# Patient Record
Sex: FEMALE | Race: WHITE | NOT HISPANIC OR LATINO | URBAN - METROPOLITAN AREA
[De-identification: names, ages, dates, MRNs, and addresses within clinical notes are randomized per-mention and may not be internally consistent; named-entity substitution may affect disease eponyms.]

---

## 2019-07-15 ENCOUNTER — MART 1 (OUTPATIENT)
Dept: URBAN - METROPOLITAN AREA CLINIC 25 | Facility: CLINIC | Age: 49
Setting detail: DERMATOLOGY
End: 2019-07-15

## 2019-07-15 PROBLEM — D03.39 MELANOMA IN SITU OF OTHER PARTS OF FACE: Status: ACTIVE | Noted: 2019-07-15

## 2019-07-15 PROBLEM — D03.39 MELANOMA IN SITU OF OTHER PARTS OF FACE: Status: RESOLVED | Noted: 2019-07-15

## 2019-07-15 PROCEDURE — 17311 MOHS 1 STAGE H/N/HF/G: CPT

## 2019-07-15 PROCEDURE — 13132 CMPLX RPR F/C/C/M/N/AX/G/H/F: CPT

## 2019-07-15 PROCEDURE — 99244 OFF/OP CNSLTJ NEW/EST MOD 40: CPT

## 2019-07-15 PROCEDURE — 88342 IMHCHEM/IMCYTCHM 1ST ANTB: CPT

## 2019-07-17 PROBLEM — D03.39 MELANOMA IN SITU OF OTHER PARTS OF FACE: Status: RESOLVED | Noted: 2019-07-17

## 2023-07-14 LAB — SARS-COV-2 RESULT: NOT DETECTED

## 2023-10-06 ENCOUNTER — OFFICE VISIT (OUTPATIENT)
Dept: URGENT CARE | Facility: CLINIC | Age: 53
End: 2023-10-06
Payer: COMMERCIAL

## 2023-10-06 VITALS
WEIGHT: 153.6 LBS | BODY MASS INDEX: 27.21 KG/M2 | OXYGEN SATURATION: 98 % | SYSTOLIC BLOOD PRESSURE: 130 MMHG | DIASTOLIC BLOOD PRESSURE: 90 MMHG | HEIGHT: 63 IN | TEMPERATURE: 98.5 F | HEART RATE: 74 BPM

## 2023-10-06 DIAGNOSIS — H66.91 ACUTE OTITIS MEDIA, RIGHT: ICD-10-CM

## 2023-10-06 DIAGNOSIS — J01.90 SUBACUTE SINUSITIS, UNSPECIFIED LOCATION: Primary | ICD-10-CM

## 2023-10-06 PROCEDURE — 99203 OFFICE O/P NEW LOW 30 MIN: CPT

## 2023-10-06 RX ORDER — METHYLPREDNISOLONE 4 MG/1
4 TABLET ORAL ONCE
Qty: 21 TABLET | Refills: 0 | Status: SHIPPED | OUTPATIENT
Start: 2023-10-06 | End: 2023-10-06

## 2023-10-06 RX ORDER — AMOXICILLIN AND CLAVULANATE POTASSIUM 875; 125 MG/1; MG/1
875 TABLET, FILM COATED ORAL EVERY 12 HOURS
Qty: 14 TABLET | Refills: 0 | Status: SHIPPED | OUTPATIENT
Start: 2023-10-06 | End: 2023-10-13

## 2023-10-06 ASSESSMENT — ENCOUNTER SYMPTOMS
RESPIRATORY NEGATIVE: 1
SINUS PAIN: 1
MYALGIAS: 1
SHORTNESS OF BREATH: 0
ARTHRALGIAS: 0
HEADACHES: 1
COUGH: 0
RHINORRHEA: 1
FEVER: 1
EYE PAIN: 1
EYE REDNESS: 0
SORE THROAT: 0
SINUS PRESSURE: 1

## 2023-10-06 NOTE — PROGRESS NOTES
"Janes Schroeder is a 53 y.o. female who presents for Sinus Problem.    Patient presents with sinus congestion and pressure much worse over the last 24 hours. She states that she has had a lot of sneezing and congestion for weeks due to allergies for which she takes allegra and other allergy medications daily. She states that she had a fever this morning and is feeling pressure in her head and eyes. She states that she did a home COVID test which was negative today.       History provided by:  Patient      /90 (BP Location: Left arm, Patient Position: Sitting, BP Cuff Size: Adult)   Pulse 74   Temp 36.9 °C (98.5 °F)   Ht 1.588 m (5' 2.5\")   Wt 69.7 kg (153 lb 9.6 oz)   SpO2 98%   BMI 27.65 kg/m²    All vitals have been reviewed and are stable.    Review of Systems   Constitutional:  Positive for fever.   HENT:  Positive for congestion, ear pain, rhinorrhea, sinus pressure, sinus pain and sneezing. Negative for sore throat.    Eyes:  Positive for pain. Negative for redness.   Respiratory: Negative.  Negative for cough and shortness of breath.    Musculoskeletal:  Positive for myalgias. Negative for arthralgias.   Neurological:  Positive for headaches.       Objective   Physical Exam  Vitals and nursing note reviewed.   Constitutional:       General: She is awake. She is not in acute distress.     Appearance: Normal appearance. She is well-developed.   HENT:      Head: Normocephalic and atraumatic.      Right Ear: Hearing, ear canal and external ear normal. A middle ear effusion is present. Tympanic membrane is erythematous.      Left Ear: Hearing, tympanic membrane, ear canal and external ear normal.      Nose: Congestion and rhinorrhea present. Rhinorrhea is purulent.      Right Turbinates: Swollen.      Left Turbinates: Swollen.      Mouth/Throat:      Pharynx: Uvula midline. Oropharyngeal exudate and posterior oropharyngeal erythema present.   Eyes:      Extraocular Movements: " Extraocular movements intact.      Pupils: Pupils are equal, round, and reactive to light.   Pulmonary:      Effort: Pulmonary effort is normal. No respiratory distress.      Breath sounds: Normal breath sounds and air entry. No stridor.   Musculoskeletal:         General: No swelling or deformity. Normal range of motion.      Cervical back: Normal range of motion.   Skin:     General: Skin is warm and dry.   Neurological:      General: No focal deficit present.      Mental Status: She is alert and oriented to person, place, and time. Mental status is at baseline.      Motor: Motor function is intact.      Coordination: Coordination is intact.   Psychiatric:         Mood and Affect: Mood and affect normal.         Speech: Speech normal.         Behavior: Behavior normal.         Assessment/Plan   Problem List Items Addressed This Visit    None  Visit Diagnoses       Subacute sinusitis, unspecified location    -  Primary    Relevant Medications    amoxicillin-pot clavulanate (Augmentin) 875-125 mg tablet    Acute otitis media, right        Relevant Medications    amoxicillin-pot clavulanate (Augmentin) 875-125 mg tablet            Red flags to report to Emergency Department have been reviewed with patient.   Patient/parent reports understanding and agreement with treatment plan made with shared decision making.

## 2023-10-09 NOTE — PATIENT INSTRUCTIONS
SUBACUTE SINUSITIS       - Pseudoephedrine - Guaifenesin (Mucinex D) may be used for sinus congestion relief and thinning of mucus      - FLUTICASONE (Flonase) is a nasal steroid that helps reduce swelling in your nasal passages, may use saline rinse (Neti pot) to clear sinus cavities before use     - Use a daily antihistamine (Zytec, Xyzal, Claritin, Allegra) for allergic rhinitis, recommended to take at nighttime as may be sedating.  If difficulty sleeping, Benadryl may be used instead     - Use Ibuprofen (Advil) or Acetaminophen (Tylenol) at home for headache and fever reduction if needed       - Increase rest and fluids to recover sooner and loosen mucus     - May use a humidifier, menthol containing cough drops, saline nasal wash (Neti pot) for symptom relief       - If no improvement after 10 days, please follow up with PCP, if you need a referral, please call our office.

## 2024-01-09 ENCOUNTER — OFFICE VISIT (OUTPATIENT)
Dept: URGENT CARE | Facility: CLINIC | Age: 54
End: 2024-01-09
Payer: COMMERCIAL

## 2024-01-09 VITALS
OXYGEN SATURATION: 99 % | HEART RATE: 77 BPM | DIASTOLIC BLOOD PRESSURE: 78 MMHG | SYSTOLIC BLOOD PRESSURE: 112 MMHG | TEMPERATURE: 97.9 F

## 2024-01-09 DIAGNOSIS — J06.9 UPPER RESPIRATORY TRACT INFECTION, UNSPECIFIED TYPE: ICD-10-CM

## 2024-01-09 DIAGNOSIS — R52 BODY ACHES: Primary | ICD-10-CM

## 2024-01-09 LAB — POC SARS-COV-2 AG BINAX: NORMAL

## 2024-01-09 PROCEDURE — 87811 SARS-COV-2 COVID19 W/OPTIC: CPT | Performed by: PHYSICIAN ASSISTANT

## 2024-01-09 PROCEDURE — 87636 SARSCOV2 & INF A&B AMP PRB: CPT

## 2024-01-09 PROCEDURE — 99214 OFFICE O/P EST MOD 30 MIN: CPT | Performed by: PHYSICIAN ASSISTANT

## 2024-01-09 RX ORDER — ESTRADIOL 0.04 MG/D
PATCH TRANSDERMAL
COMMUNITY
Start: 2021-11-10 | End: 2024-03-08 | Stop reason: DRUGHIGH

## 2024-01-09 RX ORDER — DULOXETIN HYDROCHLORIDE 20 MG/1
1 CAPSULE, DELAYED RELEASE ORAL EVERY 12 HOURS
COMMUNITY
Start: 2019-07-31

## 2024-01-09 RX ORDER — FEXOFENADINE HCL 60 MG
TABLET ORAL EVERY 12 HOURS
COMMUNITY
End: 2024-03-08 | Stop reason: DRUGHIGH

## 2024-01-09 NOTE — PROGRESS NOTES
Subjective     Nancy Schroeder is a 53 y.o. female who presents for Cough.    Patient presents with headache, chills, sinus pressure, congestion, drainage. Headaches have been located in the frontal sinuses and on the top of the head. Left is worse than right. Is also having body aches as of this morning. Is also having some insomnia the last few days. Has been using Advil in the morning. Admits to some SOB and chest tightness in the mornings, but this dissipates throughout the day. Also reports L arm tightness at night time -- has happened before when she has COVID. Denies chest pain/pressure, no increase in symptoms with activity. L arm tightness is completely gone at this time. Does not have known HTN, hyperlipidemia, DM2, or other heart history. Denies family Hx of heart issues that she is aware of.       History provided by:  Patient      /78   Pulse 77   Temp 36.6 °C (97.9 °F)   SpO2 99%    All vitals have been reviewed and are stable.    Review of Systems   All other systems reviewed and are negative.      Objective   Physical Exam  Vitals reviewed.   Constitutional:       General: She is awake.      Appearance: Normal appearance. She is well-developed.   HENT:      Head: Normocephalic and atraumatic.      Right Ear: Tympanic membrane and ear canal normal.      Left Ear: Tympanic membrane and ear canal normal.      Nose: Mucosal edema and congestion present.      Right Turbinates: Enlarged.      Left Turbinates: Enlarged.      Left Sinus: Frontal sinus tenderness present.      Mouth/Throat:      Lips: Pink.      Mouth: Mucous membranes are moist.      Pharynx: Oropharynx is clear.   Cardiovascular:      Rate and Rhythm: Normal rate and regular rhythm.   Pulmonary:      Effort: Pulmonary effort is normal.      Breath sounds: Normal breath sounds.   Musculoskeletal:      Cervical back: Full passive range of motion without pain.      Right lower leg: No edema.      Left lower leg: No edema.   Skin:      General: Skin is warm and dry.      Findings: No lesion or rash.   Neurological:      General: No focal deficit present.      Mental Status: She is alert and oriented to person, place, and time.      Cranial Nerves: No facial asymmetry.      Motor: Motor function is intact.      Gait: Gait is intact.   Psychiatric:         Attention and Perception: Attention normal.         Mood and Affect: Mood and affect normal.         Assessment/Plan   Problem List Items Addressed This Visit    None  Visit Diagnoses       Body aches    -  Primary    Relevant Orders    Sars-CoV-2 and Influenza A/B PCR    Upper respiratory tract infection, unspecified type        Relevant Orders    POCT BinaxNOW Covid-19 Ag Card manually resulted (Completed)            For now, use nasal spray (Nasocort) and OTC medications for your symptoms. Negative rapid COVID, but PCR was sent out for flu and COVID just in case. Will contact you regarding results and determine if symptoms are worsening or if anything else needs done at that time. Increase rest and fluids. Did offer EKG at this time due to L arm symptoms with some intermittent SOB and chest tightness -- pt declined, states that it was only intermittent and did not feel a pain, only a tightness like it was hard to breathe. Has also been under increased stress, which she thinks could have been causing it as well. Red flag symptoms reviewed with patient and all questions answered. Patient or parent/guardian verbalized understanding and agreement with care plan as above. All in office testing reviewed with patient. If symptoms worsen or do not improve, patient is to follow up with PCP or report to the ER.

## 2024-01-10 LAB
FLUAV RNA RESP QL NAA+PROBE: NOT DETECTED
FLUBV RNA RESP QL NAA+PROBE: NOT DETECTED
SARS-COV-2 RNA RESP QL NAA+PROBE: NOT DETECTED

## 2024-01-22 ENCOUNTER — OFFICE VISIT (OUTPATIENT)
Dept: URGENT CARE | Facility: CLINIC | Age: 54
End: 2024-01-22
Payer: COMMERCIAL

## 2024-01-22 VITALS
TEMPERATURE: 98.7 F | DIASTOLIC BLOOD PRESSURE: 86 MMHG | WEIGHT: 156 LBS | OXYGEN SATURATION: 97 % | HEART RATE: 61 BPM | SYSTOLIC BLOOD PRESSURE: 137 MMHG | HEIGHT: 63 IN | BODY MASS INDEX: 27.64 KG/M2

## 2024-01-22 DIAGNOSIS — J01.10 ACUTE NON-RECURRENT FRONTAL SINUSITIS: ICD-10-CM

## 2024-01-22 DIAGNOSIS — R30.0 DYSURIA: ICD-10-CM

## 2024-01-22 DIAGNOSIS — J06.9 VIRAL UPPER RESPIRATORY TRACT INFECTION: Primary | ICD-10-CM

## 2024-01-22 LAB
POC APPEARANCE, URINE: CLEAR
POC BILIRUBIN, URINE: NEGATIVE
POC BLOOD, URINE: NEGATIVE
POC COLOR, URINE: ABNORMAL
POC GLUCOSE, URINE: NEGATIVE MG/DL
POC KETONES, URINE: NEGATIVE MG/DL
POC LEUKOCYTES, URINE: NEGATIVE
POC NITRITE,URINE: NEGATIVE
POC PH, URINE: 7.5 PH
POC PROTEIN, URINE: NEGATIVE MG/DL
POC SARS-COV-2 AG BINAX: NORMAL
POC SPECIFIC GRAVITY, URINE: 1.01
POC UROBILINOGEN, URINE: 0.2 EU/DL

## 2024-01-22 PROCEDURE — 87636 SARSCOV2 & INF A&B AMP PRB: CPT

## 2024-01-22 PROCEDURE — 81003 URINALYSIS AUTO W/O SCOPE: CPT | Mod: CLIA WAIVED TEST | Performed by: PHYSICIAN ASSISTANT

## 2024-01-22 PROCEDURE — 99213 OFFICE O/P EST LOW 20 MIN: CPT | Performed by: PHYSICIAN ASSISTANT

## 2024-01-22 PROCEDURE — 87811 SARS-COV-2 COVID19 W/OPTIC: CPT | Mod: CLIA WAIVED TEST | Performed by: PHYSICIAN ASSISTANT

## 2024-01-22 RX ORDER — AMOXICILLIN AND CLAVULANATE POTASSIUM 875; 125 MG/1; MG/1
1 TABLET, FILM COATED ORAL 2 TIMES DAILY
Qty: 20 TABLET | Refills: 0 | Status: SHIPPED | OUTPATIENT
Start: 2024-01-22 | End: 2024-02-01

## 2024-01-22 NOTE — PROGRESS NOTES
"Janes Schroeder is a 53 y.o. female who presents for No chief complaint on file..    Patient presents today with nose congestion, ears feel plugged, fever yesterday, head pressure, thoracic back pain, arm pain, frequency urination, smell x 2 days. States that the abdominal pain and the urinary odor is negative. Was seen earlier in the month and tested for influenza, COVID -- all negative at that time. Denies using any OTC medications for her Sx. Denies: dizziness, CP, N/V/D, rash, swelling, bruising, loss of sense of taste/smell. Also had a lot of fatigue, feeling worn down.       History provided by:  Patient   used: No        /86 (BP Location: Left arm, Patient Position: Sitting, BP Cuff Size: Small adult)   Pulse 61   Temp 37.1 °C (98.7 °F) (Oral)   Ht 1.588 m (5' 2.5\")   Wt 70.8 kg (156 lb)   SpO2 97%   BMI 28.08 kg/m²    All vitals have been reviewed and are stable.    Review of Systems   All other systems reviewed and are negative.      Objective     Physical Exam  Vitals reviewed.   Constitutional:       General: She is awake.      Appearance: Normal appearance. She is well-developed.   HENT:      Head: Normocephalic and atraumatic.      Right Ear: Tympanic membrane and ear canal normal.      Left Ear: Tympanic membrane and ear canal normal.      Nose: Rhinorrhea present. Rhinorrhea is clear.      Right Turbinates: Enlarged and swollen.      Left Turbinates: Enlarged and swollen.      Mouth/Throat:      Lips: Pink.      Mouth: Mucous membranes are moist.      Pharynx: Oropharynx is clear.   Cardiovascular:      Rate and Rhythm: Normal rate and regular rhythm.   Pulmonary:      Effort: Pulmonary effort is normal.      Breath sounds: Decreased air movement and transmitted upper airway sounds present. No wheezing, rhonchi or rales.   Musculoskeletal:      Cervical back: Full passive range of motion without pain.      Right lower leg: No edema.      Left lower leg: " No edema.   Skin:     General: Skin is warm and dry.      Findings: No lesion or rash.   Neurological:      General: No focal deficit present.      Mental Status: She is alert and oriented to person, place, and time.      Cranial Nerves: No facial asymmetry.      Motor: Motor function is intact.      Gait: Gait is intact.   Psychiatric:         Attention and Perception: Attention normal.         Mood and Affect: Mood and affect normal.         Assessment/Plan   Problem List Items Addressed This Visit    None  Visit Diagnoses       Viral upper respiratory tract infection    -  Primary    Relevant Orders    Sars-CoV-2 and Influenza A/B PCR (Completed)    Dysuria        Relevant Orders    POCT UA Automated manually resulted (Completed)    POCT BinaxNOW Covid-19 Ag Card manually resulted (Completed)    Acute non-recurrent frontal sinusitis              Augmentin Rx given for suspected sinusitis. Negative POCT flu and COVID. PCR testing was done as well. Take medication as Rx, can use OTC medications with this as well. Increase rest and fluids.    Patient education provided to patient and documented in AVS. Red flag symptoms reviewed with patient and all questions answered. Patient or parent/guardian verbalized understanding and agreement with care plan as above. All in office testing reviewed with patient. If symptoms worsen or do not improve, patient is to follow up with PCP or report to the ER.

## 2024-03-08 ENCOUNTER — OFFICE VISIT (OUTPATIENT)
Dept: URGENT CARE | Facility: CLINIC | Age: 54
End: 2024-03-08
Payer: COMMERCIAL

## 2024-03-08 VITALS
HEIGHT: 63 IN | BODY MASS INDEX: 27.21 KG/M2 | WEIGHT: 153.6 LBS | TEMPERATURE: 98.9 F | DIASTOLIC BLOOD PRESSURE: 96 MMHG | SYSTOLIC BLOOD PRESSURE: 151 MMHG | OXYGEN SATURATION: 97 % | HEART RATE: 79 BPM

## 2024-03-08 DIAGNOSIS — J01.10 ACUTE NON-RECURRENT FRONTAL SINUSITIS: ICD-10-CM

## 2024-03-08 DIAGNOSIS — J06.9 VIRAL UPPER RESPIRATORY TRACT INFECTION: Primary | ICD-10-CM

## 2024-03-08 LAB
POC RAPID INFLUENZA A: NEGATIVE
POC RAPID INFLUENZA B: NEGATIVE
POC SARS-COV-2 AG BINAX: NORMAL

## 2024-03-08 PROCEDURE — 87811 SARS-COV-2 COVID19 W/OPTIC: CPT | Mod: CLIA WAIVED TEST | Performed by: NURSE PRACTITIONER

## 2024-03-08 PROCEDURE — 99213 OFFICE O/P EST LOW 20 MIN: CPT | Performed by: NURSE PRACTITIONER

## 2024-03-08 PROCEDURE — 87804 INFLUENZA ASSAY W/OPTIC: CPT | Mod: CLIA WAIVED TEST | Performed by: NURSE PRACTITIONER

## 2024-03-08 RX ORDER — FAMOTIDINE 20 MG/1
20 TABLET, FILM COATED ORAL
COMMUNITY
Start: 2022-05-02

## 2024-03-08 RX ORDER — AMOXICILLIN AND CLAVULANATE POTASSIUM 875; 125 MG/1; MG/1
1 TABLET, FILM COATED ORAL 2 TIMES DAILY
Qty: 14 TABLET | Refills: 0 | Status: SHIPPED | OUTPATIENT
Start: 2024-03-08 | End: 2024-03-15

## 2024-03-08 RX ORDER — OMEPRAZOLE 20 MG/1
1 CAPSULE, DELAYED RELEASE ORAL EVERY 24 HOURS
COMMUNITY

## 2024-03-08 RX ORDER — FEXOFENADINE HCL 60 MG
TABLET ORAL
COMMUNITY
Start: 2017-10-23

## 2024-03-08 RX ORDER — ESTRADIOL 0.03 MG/D
FILM, EXTENDED RELEASE TRANSDERMAL
COMMUNITY
Start: 2024-02-12

## 2024-03-08 RX ORDER — CYANOCOBALAMIN (VITAMIN B-12) 500 MCG
TABLET ORAL
COMMUNITY
Start: 2015-11-16

## 2024-03-08 ASSESSMENT — ENCOUNTER SYMPTOMS: SINUS COMPLAINT: 1

## 2024-03-08 NOTE — PROGRESS NOTES
Subjective   Patient ID: Nancy Schroeder is a 53 y.o. female.    Patient presents today with joint pain, runny nose, sore throat, headache 2 days, wants covid and flu testing she states this started like allergies with runny nose and congestion then developed into sinus pressure.  Slight joint pain but she states has been under a lot of stress recently as well.  Denies any fever.,  Believes it is more of a sinus infection.      History provided by:  Patient   used: No    Sinus Problem      The following portions of the chart were reviewed this encounter and updated as appropriate:         Review of Systems   All other systems reviewed and are negative.    Objective   Physical Exam  Vitals and nursing note reviewed.   Constitutional:       General: She is not in acute distress.     Appearance: Normal appearance. She is not toxic-appearing.   HENT:      Head: Normocephalic.      Right Ear: External ear normal.      Left Ear: External ear normal.      Nose: Nose normal.      Mouth/Throat:      Mouth: Mucous membranes are moist.      Pharynx: No oropharyngeal exudate or posterior oropharyngeal erythema.   Eyes:      Extraocular Movements: Extraocular movements intact.   Cardiovascular:      Rate and Rhythm: Normal rate and regular rhythm.      Heart sounds: Normal heart sounds.   Pulmonary:      Effort: Pulmonary effort is normal.      Breath sounds: Normal breath sounds. No wheezing.   Musculoskeletal:         General: Normal range of motion.      Cervical back: Normal range of motion and neck supple.   Skin:     Capillary Refill: Capillary refill takes less than 2 seconds.   Neurological:      General: No focal deficit present.      Mental Status: She is alert and oriented to person, place, and time.   Psychiatric:         Mood and Affect: Mood normal.         Behavior: Behavior normal.         Thought Content: Thought content normal.       Procedures    Assessment/Plan   Diagnoses and all orders  for this visit:  Viral upper respiratory tract infection  -     POCT Influenza A/B manually resulted  -     POCT BinaxNOW Covid-19 Ag Card manually resulted  Acute non-recurrent frontal sinusitis  -     amoxicillin-pot clavulanate (Augmentin) 875-125 mg tablet; Take 1 tablet by mouth 2 times a day for 7 days.

## 2024-03-19 ENCOUNTER — OFFICE VISIT (OUTPATIENT)
Dept: URGENT CARE | Facility: CLINIC | Age: 54
End: 2024-03-19
Payer: COMMERCIAL

## 2024-03-19 VITALS
SYSTOLIC BLOOD PRESSURE: 160 MMHG | DIASTOLIC BLOOD PRESSURE: 90 MMHG | BODY MASS INDEX: 27.18 KG/M2 | WEIGHT: 151 LBS | HEART RATE: 89 BPM | OXYGEN SATURATION: 95 %

## 2024-03-19 DIAGNOSIS — J06.9 VIRAL UPPER RESPIRATORY TRACT INFECTION: Primary | ICD-10-CM

## 2024-03-19 DIAGNOSIS — J01.40 SUBACUTE PANSINUSITIS: ICD-10-CM

## 2024-03-19 PROBLEM — R09.82 POST-NASAL DRAINAGE: Status: RESOLVED | Noted: 2024-03-19 | Resolved: 2024-03-19

## 2024-03-19 PROBLEM — J33.9 NASAL POLYP: Status: ACTIVE | Noted: 2024-03-19

## 2024-03-19 PROBLEM — R35.0 URINARY FREQUENCY: Status: RESOLVED | Noted: 2024-03-19 | Resolved: 2024-03-19

## 2024-03-19 PROBLEM — J30.89 ENVIRONMENTAL AND SEASONAL ALLERGIES: Status: ACTIVE | Noted: 2024-03-19

## 2024-03-19 PROBLEM — K21.9 GASTROESOPHAGEAL REFLUX DISEASE WITHOUT ESOPHAGITIS: Status: ACTIVE | Noted: 2024-03-19

## 2024-03-19 PROBLEM — H93.293 ABNORMAL AUDITORY PERCEPTION OF BOTH EARS: Status: ACTIVE | Noted: 2024-03-19

## 2024-03-19 PROBLEM — R51.9 FACIAL PAIN: Status: ACTIVE | Noted: 2024-03-19

## 2024-03-19 PROBLEM — R51.9 FRONTAL HEADACHE: Status: ACTIVE | Noted: 2024-03-19

## 2024-03-19 PROBLEM — R52 GENERALIZED PAIN: Status: ACTIVE | Noted: 2024-03-19

## 2024-03-19 PROBLEM — J34.2 DEVIATED NASAL SEPTUM: Status: ACTIVE | Noted: 2024-03-19

## 2024-03-19 PROBLEM — R06.83 SNORING: Status: ACTIVE | Noted: 2024-03-19

## 2024-03-19 PROBLEM — R10.2 SUPRAPUBIC PAIN: Status: RESOLVED | Noted: 2024-03-19 | Resolved: 2024-03-19

## 2024-03-19 PROBLEM — D03.30: Status: ACTIVE | Noted: 2020-07-22

## 2024-03-19 PROBLEM — J34.1 RETENTION CYST OF NASAL SINUS: Status: ACTIVE | Noted: 2024-03-19

## 2024-03-19 PROBLEM — Z86.39 HISTORY OF ENDOCRINE DISORDER: Status: RESOLVED | Noted: 2024-03-19 | Resolved: 2024-03-19

## 2024-03-19 PROBLEM — Z85.828 HISTORY OF MALIGNANT NEOPLASM OF SKIN: Status: RESOLVED | Noted: 2024-03-19 | Resolved: 2024-03-19

## 2024-03-19 PROBLEM — R06.02 SHORTNESS OF BREATH WITH EXPOSURE TO COVID-19 VIRUS: Status: RESOLVED | Noted: 2024-03-19 | Resolved: 2024-03-19

## 2024-03-19 PROBLEM — M22.42 CHONDROMALACIA OF LEFT PATELLA: Status: ACTIVE | Noted: 2019-08-02

## 2024-03-19 PROBLEM — Z86.59 HISTORY OF DEPRESSION: Status: RESOLVED | Noted: 2024-03-19 | Resolved: 2024-03-19

## 2024-03-19 PROBLEM — R03.0 FINDING OF ABOVE NORMAL BLOOD PRESSURE: Status: ACTIVE | Noted: 2024-03-19

## 2024-03-19 PROBLEM — Z20.822 SHORTNESS OF BREATH WITH EXPOSURE TO COVID-19 VIRUS: Status: RESOLVED | Noted: 2024-03-19 | Resolved: 2024-03-19

## 2024-03-19 PROBLEM — J34.3 NASAL TURBINATE HYPERTROPHY: Status: ACTIVE | Noted: 2024-03-19

## 2024-03-19 PROBLEM — J32.0 RIGHT MAXILLARY SINUSITIS: Status: RESOLVED | Noted: 2024-03-19 | Resolved: 2024-03-19

## 2024-03-19 PROBLEM — F41.9 ANXIETY: Status: ACTIVE | Noted: 2024-03-19

## 2024-03-19 LAB — POC SARS-COV-2 AG BINAX: NORMAL

## 2024-03-19 PROCEDURE — 87811 SARS-COV-2 COVID19 W/OPTIC: CPT

## 2024-03-19 PROCEDURE — 99214 OFFICE O/P EST MOD 30 MIN: CPT

## 2024-03-19 PROCEDURE — 87636 SARSCOV2 & INF A&B AMP PRB: CPT

## 2024-03-19 RX ORDER — BENZONATATE 200 MG/1
200 CAPSULE ORAL 3 TIMES DAILY PRN
Qty: 21 CAPSULE | Refills: 0 | Status: SHIPPED | OUTPATIENT
Start: 2024-03-19 | End: 2024-03-26

## 2024-03-19 RX ORDER — DOXYCYCLINE 100 MG/1
100 CAPSULE ORAL 2 TIMES DAILY
Qty: 14 CAPSULE | Refills: 0 | Status: SHIPPED | OUTPATIENT
Start: 2024-03-19 | End: 2024-03-26

## 2024-03-19 RX ORDER — METHYLPREDNISOLONE 4 MG/1
TABLET ORAL
Qty: 21 TABLET | Refills: 0 | Status: SHIPPED | OUTPATIENT
Start: 2024-03-19

## 2024-03-19 ASSESSMENT — ENCOUNTER SYMPTOMS
SWEATS: 1
DIARRHEA: 0
SHORTNESS OF BREATH: 0
DIZZINESS: 0
NAUSEA: 0
DIAPHORESIS: 1
SORE THROAT: 0
SINUS PRESSURE: 1
CHEST TIGHTNESS: 0
HEADACHES: 1
FATIGUE: 0
GASTROINTESTINAL NEGATIVE: 1
CHILLS: 0
RHINORRHEA: 1
PALPITATIONS: 0
COUGH: 1
MYALGIAS: 1
WHEEZING: 0
CARDIOVASCULAR NEGATIVE: 1
ABDOMINAL PAIN: 0
FEVER: 0
TROUBLE SWALLOWING: 0
ARTHRALGIAS: 0
VOMITING: 0

## 2024-03-19 ASSESSMENT — COPD QUESTIONNAIRES: COPD: 0

## 2024-03-19 NOTE — PROGRESS NOTES
Subjective     Nancy Schroeder is a 53 y.o. female who presents for Cough.    Patient presents with cough, headache, diarrhea, and muscle aches for the last 1-2 days. She reports that her mom is hospitalized with COVID and she has been visiting/exposed. She states that she has been having sinus congestion/drainage and pressure for the last 2 weeks and was seen 1.5 weeks ago and prescribed 7 days augmentin which she completed. She reports that her symptoms got a little better but then worse again now.       History provided by:  Patient and medical records  Cough  This is a recurrent problem. The cough is Non-productive. Associated symptoms include headaches, myalgias, nasal congestion, postnasal drip, rhinorrhea and sweats. Pertinent negatives include no chest pain, chills, ear congestion, ear pain, fever, rash, sore throat, shortness of breath or wheezing. She has tried OTC cough suppressant and rest for the symptoms. Her past medical history is significant for environmental allergies. There is no history of asthma or COPD.       /90   Pulse 89   Wt 68.5 kg (151 lb)   SpO2 95%   BMI 27.18 kg/m²    All vitals have been reviewed and are stable.    Review of Systems   Constitutional:  Positive for diaphoresis. Negative for chills, fatigue and fever.   HENT:  Positive for congestion, postnasal drip, rhinorrhea and sinus pressure. Negative for ear pain, sore throat and trouble swallowing.    Respiratory:  Positive for cough. Negative for chest tightness, shortness of breath and wheezing.    Cardiovascular: Negative.  Negative for chest pain and palpitations.   Gastrointestinal: Negative.  Negative for abdominal pain, diarrhea, nausea and vomiting.   Musculoskeletal:  Positive for myalgias. Negative for arthralgias.   Skin: Negative.  Negative for rash.   Allergic/Immunologic: Positive for environmental allergies.   Neurological:  Positive for headaches. Negative for dizziness.       Objective   Physical  Exam  Vitals and nursing note reviewed.   Constitutional:       General: She is not in acute distress.     Appearance: Normal appearance. She is ill-appearing.   HENT:      Head: Normocephalic and atraumatic.      Right Ear: External ear normal.      Left Ear: External ear normal.      Nose: Mucosal edema, congestion and rhinorrhea present.      Right Sinus: Maxillary sinus tenderness present.      Left Sinus: Maxillary sinus tenderness present.      Mouth/Throat:      Lips: Pink.      Mouth: Mucous membranes are moist.      Palate: No lesions.      Pharynx: Uvula midline. Oropharyngeal exudate and posterior oropharyngeal erythema present.      Tonsils: No tonsillar exudate.   Eyes:      Extraocular Movements: Extraocular movements intact.      Conjunctiva/sclera: Conjunctivae normal.      Right eye: Right conjunctiva is not injected.      Left eye: Left conjunctiva is not injected.      Pupils: Pupils are equal, round, and reactive to light.   Cardiovascular:      Rate and Rhythm: Normal rate and regular rhythm.      Heart sounds: Normal heart sounds.   Pulmonary:      Effort: Pulmonary effort is normal. No respiratory distress.      Breath sounds: Normal breath sounds and air entry. Transmitted upper airway sounds present. No stridor. No wheezing, rhonchi or rales.   Abdominal:      General: Abdomen is flat.      Palpations: Abdomen is soft.      Tenderness: There is no abdominal tenderness.   Musculoskeletal:         General: Normal range of motion.      Cervical back: Normal range of motion and neck supple.   Lymphadenopathy:      Cervical: No cervical adenopathy.   Skin:     General: Skin is warm and dry.   Neurological:      General: No focal deficit present.      Mental Status: She is alert and oriented to person, place, and time. Mental status is at baseline.   Psychiatric:         Mood and Affect: Mood normal.         Behavior: Behavior normal.         Assessment/Plan   Problem List Items Addressed This  Visit    None  Visit Diagnoses       Viral upper respiratory tract infection    -  Primary    Relevant Medications    benzonatate (Tessalon) 200 mg capsule    methylPREDNISolone (Medrol Dospak) 4 mg tablets    Other Relevant Orders    Sars-CoV-2 and Influenza A/B PCR    POCT BinaxNOW Covid-19 Ag Card manually resulted (Completed)    Subacute pansinusitis        Relevant Medications    doxycycline (Monodox) 100 mg capsule    methylPREDNISolone (Medrol Dospak) 4 mg tablets            Red flags for reporting to ER have been reviewed with the patient.    Current diagnosis, any medication changes, and all in-office lab or radiologic results have been reviewed with the patient at the time of the visit.   If symptoms do not improve or worsen, patient is to follow up with PCP or report to the emergency room.   Patient is alert and oriented x3 and non-toxic appearing. Vital signs are stable.   Patient and/or guardian has sufficient decision-making capabilities at this time and reports understanding and agreement with the treatment plan made through shared decision-making.

## 2024-07-12 ENCOUNTER — OFFICE VISIT (OUTPATIENT)
Dept: URGENT CARE | Facility: CLINIC | Age: 54
End: 2024-07-12
Payer: COMMERCIAL

## 2024-07-12 VITALS
BODY MASS INDEX: 26.64 KG/M2 | SYSTOLIC BLOOD PRESSURE: 129 MMHG | DIASTOLIC BLOOD PRESSURE: 92 MMHG | TEMPERATURE: 98.5 F | OXYGEN SATURATION: 98 % | WEIGHT: 148 LBS | HEART RATE: 78 BPM

## 2024-07-12 DIAGNOSIS — J06.9 UPPER RESPIRATORY TRACT INFECTION, UNSPECIFIED TYPE: Primary | ICD-10-CM

## 2024-07-12 DIAGNOSIS — J01.00 SUBACUTE MAXILLARY SINUSITIS: ICD-10-CM

## 2024-07-12 LAB — POC SARS-COV-2 AG BINAX: NORMAL

## 2024-07-12 PROCEDURE — 87636 SARSCOV2 & INF A&B AMP PRB: CPT

## 2024-07-12 PROCEDURE — 99214 OFFICE O/P EST MOD 30 MIN: CPT

## 2024-07-12 PROCEDURE — 87811 SARS-COV-2 COVID19 W/OPTIC: CPT

## 2024-07-12 RX ORDER — DULOXETINE 40 MG/1
1 CAPSULE, DELAYED RELEASE ORAL
COMMUNITY
Start: 2024-01-08

## 2024-07-12 RX ORDER — PROGESTERONE 100 MG/1
100 CAPSULE ORAL NIGHTLY
COMMUNITY
Start: 2023-08-14

## 2024-07-12 RX ORDER — BLOOD-GLUCOSE SENSOR
EACH MISCELLANEOUS
COMMUNITY
Start: 2024-03-19

## 2024-07-12 RX ORDER — MULTIVIT-MIN/IRON FUM/FOLIC AC 7.5 MG-4
TABLET ORAL
COMMUNITY

## 2024-07-12 ASSESSMENT — VISUAL ACUITY: OU: 1

## 2024-07-12 ASSESSMENT — ENCOUNTER SYMPTOMS
CHILLS: 1
WEAKNESS: 1
FATIGUE: 1
ARTHRALGIAS: 0
MYALGIAS: 1
CARDIOVASCULAR NEGATIVE: 1
SINUS PRESSURE: 1
RESPIRATORY NEGATIVE: 1
LIGHT-HEADEDNESS: 0
DIZZINESS: 0
JOINT SWELLING: 0
FEVER: 0
FACIAL SWELLING: 0
RHINORRHEA: 1
DIARRHEA: 1
SORE THROAT: 0
SINUS PAIN: 1
NAUSEA: 0
VOMITING: 0
PALPITATIONS: 0
COUGH: 0
SHORTNESS OF BREATH: 0
DIAPHORESIS: 1
HEADACHES: 1
ABDOMINAL PAIN: 0
VOICE CHANGE: 0
TROUBLE SWALLOWING: 0
CHEST TIGHTNESS: 0

## 2024-07-12 NOTE — PROGRESS NOTES
Subjective   History  Nancy Schroeder is a 54 y.o. female who presents for Headache and Sinus Problem.    Patient presents with persistent headache, sinus congestion/pressure, fatigue, and loose stools for the last 2-3 days. She reports recent travel to [place] so she is concerned for COVID infection. She reports a history of recurrent/chronic sinusitis and seasonal allergies. She reports that she takes allegra daily, but hasn't been doing nasal sprays currently. Discussed sending in antibiotic due to history and symptoms if COVID testing is negative and symptoms persist.        History provided by:  Patient and medical records  Headache  Radiates to:  Eyes  Associated symptoms: congestion, diarrhea, drainage, fatigue, myalgias, sinus pressure and weakness    Associated symptoms: no abdominal pain, no cough, no dizziness, no ear pain, no fever, no nausea, no sore throat and no vomiting      Past Surgical History:   Procedure Laterality Date    BLADDER SURGERY  10/10/2017    Bladder Surgery    HERNIA REPAIR  10/10/2017    Hernia Repair    OTHER SURGICAL HISTORY  03/11/2020    Hysterectomy     Social History     Tobacco Use    Smoking status: Not on file    Smokeless tobacco: Not on file   Substance Use Topics    Alcohol use: Not on file    Drug use: Not on file       Review of Systems   Review of Systems   Constitutional:  Positive for chills, diaphoresis and fatigue. Negative for fever.   HENT:  Positive for congestion, postnasal drip, rhinorrhea, sinus pressure and sinus pain. Negative for ear pain, facial swelling, sore throat, trouble swallowing and voice change.    Respiratory: Negative.  Negative for cough, chest tightness and shortness of breath.    Cardiovascular: Negative.  Negative for chest pain and palpitations.   Gastrointestinal:  Positive for diarrhea. Negative for abdominal pain, nausea and vomiting.   Musculoskeletal:  Positive for myalgias. Negative for arthralgias and joint swelling.   Skin:  Negative.  Negative for rash.   Allergic/Immunologic: Positive for environmental allergies.   Neurological:  Positive for weakness and headaches. Negative for dizziness and light-headedness.       Objective   Vital Signs  BP (!) 129/92   Pulse 78   Temp 36.9 °C (98.5 °F)   Wt 67.1 kg (148 lb)   SpO2 98%   BMI 26.64 kg/m²    All vitals have been reviewed and are stable.     Physical Exam  Physical Exam  Vitals and nursing note reviewed.   Constitutional:       General: She is not in acute distress.     Appearance: Normal appearance. She is not ill-appearing or diaphoretic.   HENT:      Head: Normocephalic and atraumatic. No right periorbital erythema or left periorbital erythema.      Jaw: There is normal jaw occlusion.      Right Ear: External ear normal.      Left Ear: External ear normal.      Nose: Mucosal edema, congestion and rhinorrhea present. No septal deviation.      Right Turbinates: Swollen.      Left Turbinates: Swollen.      Right Sinus: Maxillary sinus tenderness present.      Left Sinus: Maxillary sinus tenderness present.      Mouth/Throat:      Lips: Pink. No lesions.      Mouth: Mucous membranes are moist.      Palate: No lesions.      Pharynx: Uvula midline. Posterior oropharyngeal erythema and postnasal drip present. No uvula swelling.      Tonsils: No tonsillar exudate.   Eyes:      General: Lids are normal. Vision grossly intact.         Right eye: No discharge.         Left eye: No discharge.      Extraocular Movements: Extraocular movements intact.      Conjunctiva/sclera: Conjunctivae normal.      Right eye: Right conjunctiva is not injected.      Left eye: Left conjunctiva is not injected.      Pupils: Pupils are equal, round, and reactive to light.   Cardiovascular:      Rate and Rhythm: Normal rate and regular rhythm.      Heart sounds: Normal heart sounds.   Pulmonary:      Effort: Pulmonary effort is normal. No tachypnea, accessory muscle usage or respiratory distress.      Breath  sounds: Normal breath sounds and air entry. Transmitted upper airway sounds present. No stridor. No wheezing, rhonchi or rales.   Abdominal:      General: Abdomen is flat.      Palpations: Abdomen is soft.   Musculoskeletal:         General: Normal range of motion.      Cervical back: Full passive range of motion without pain, normal range of motion and neck supple.   Lymphadenopathy:      Cervical: No cervical adenopathy.   Skin:     General: Skin is warm and dry.      Coloration: Skin is not pale or sallow.      Findings: No erythema, petechiae or rash.   Neurological:      General: No focal deficit present.      Mental Status: She is alert and oriented to person, place, and time. Mental status is at baseline.   Psychiatric:         Mood and Affect: Mood normal.         Behavior: Behavior normal.         Diagnostic Results   Recent Results (from the past 48 hour(s))   POCT [facility] Covid-19 Ag Card manually resulted    Collection Time: 07/12/24 12:03 PM   Result Value Ref Range    POC ONEL-COV-2 AG  Presumptive negative test for SARS-CoV-2 (no antigen detected)     Presumptive negative test for SARS-CoV-2 (no antigen detected)       Assessment/Plan   Procedures   N/A    Problem List Items Addressed This Visit    None  Visit Diagnoses       Upper respiratory tract infection, unspecified type    -  Primary    Relevant Orders    POCT [facility] Covid-19 Ag Card manually resulted (Completed)    Influenza A, and B PCR    Sars-CoV-2 PCR    Subacute maxillary sinusitis                UC Course  MDM    Patient disposition: Home    Red flags for reporting to ER have been reviewed with the patient.    Current diagnosis, any medication changes, and all in-office lab or radiologic results have been reviewed with the patient at the time of the visit.   If symptoms do not improve or worsen, patient is to follow up with PCP or report to the emergency room.   Patient is alert and oriented x3 and non-toxic appearing. Vital signs are  stable.   Patient and/or guardian has sufficient decision-making capabilities at this time and reports understanding and agreement with the treatment plan made through shared decision-making.

## 2024-07-12 NOTE — PATIENT INSTRUCTIONS
UPPER RESPIRATORY INFECTION       - In office rapid COVID testing was performed with negative results   - COVID-19 and flu PCR testing performed in office today, your results can take 24-72 hours to come back     - BENZONATATE (Tessalon perles) have been prescribed to help reduce bronchial irritation and cough   - Mucinex is recommended to help thin mucus making it easier to clear and reduce nasal congestion    - FLUTICASONE (Flonase) is an OTC nasal steroid that helps reduce swelling in your nasal passages, may use saline rinse (Neti pot) to clear sinus cavities before use        - Recommend over-the-counter (OTC) nasal decongestant (Phenylephrine) or Mucinex DM (cough suppressant) and Medrol steroid (if prescribed) during the DAY (awake hours) and Bromfed syrup at NIGHT (before sleep) if cough syrup causes excessive drowsiness     - Use Ibuprofen (Advil) or Acetaminophen (Tylenol) at home for headache and fever reduction if needed   - Recommend use of OTC cough and cold syrups like Dayquil/Nyquil or Mucinex containing guaifenesin (thins mucus), phenylephrine (nasal decongestant) , and/or dextromethorphan (cough suppressant) if not using Bromfed RX       - Increase rest and fluids to recover sooner and loosen mucus     - Warm and cold liquids such as tea, broth-based soup, or popsicles can help temporarily relieve pain in throat     - May use a humidifier, cough drops, saline nasal wash (Neti pot) for symptom relief     - Avoid cigarette smoke and do not vape as this will continue to irritate airway (may use nicotine gum or patches if nicotine dependent)    - While you are awaiting COVID-19 test results, you are to self-quarantine in your home. Use a separate room and restroom if possible. Wear a mask, gloves when possible and stay six feet from others in your home. You may search the CDC website for official guidelines on proper self-quarantining procedures. Only leave the home if there is worsening shortness of  breath, chest pain, or continued fever that cannot be controlled (or any other new/concerning symptoms), in which case you should call or report to your local ER.  If you cannot safely get to the ER by private vehicle, please call 911 if needed.

## 2024-07-13 ENCOUNTER — TELEPHONE (OUTPATIENT)
Dept: URGENT CARE | Facility: CLINIC | Age: 54
End: 2024-07-13
Payer: COMMERCIAL

## 2024-07-13 DIAGNOSIS — J01.90 SUBACUTE SINUSITIS, UNSPECIFIED LOCATION: Primary | ICD-10-CM

## 2024-07-13 RX ORDER — AMOXICILLIN AND CLAVULANATE POTASSIUM 875; 125 MG/1; MG/1
1 TABLET, FILM COATED ORAL 2 TIMES DAILY
Qty: 20 TABLET | Refills: 0 | Status: SHIPPED | OUTPATIENT
Start: 2024-07-13 | End: 2024-07-23

## 2024-07-13 NOTE — TELEPHONE ENCOUNTER
Patient called stating that she would like abx called into local pharmacy. She states this was discussed at the visit with provider. Please advise.

## 2024-07-13 NOTE — TELEPHONE ENCOUNTER
Called in Augmentin to pt's pharmacy. Did not specify which abx in office note, but looks like pt has taken this previously.

## 2024-07-13 NOTE — TELEPHONE ENCOUNTER
I left a message for the patient stating medication has been called into the pharmacy however I did not leave name of medication or pharmacy name on vm.